# Patient Record
Sex: MALE | Race: WHITE | Employment: OTHER | URBAN - METROPOLITAN AREA
[De-identification: names, ages, dates, MRNs, and addresses within clinical notes are randomized per-mention and may not be internally consistent; named-entity substitution may affect disease eponyms.]

---

## 2017-01-12 ENCOUNTER — ANESTHESIA EVENT (OUTPATIENT)
Dept: SURGERY | Age: 82
DRG: 470 | End: 2017-01-12
Payer: MEDICARE

## 2017-01-12 PROBLEM — M19.011 OSTEOARTHRITIS OF BILATERAL GLENOHUMERAL JOINTS: Status: ACTIVE | Noted: 2017-01-12

## 2017-01-12 PROBLEM — M19.012 OSTEOARTHRITIS OF BILATERAL GLENOHUMERAL JOINTS: Status: ACTIVE | Noted: 2017-01-12

## 2017-01-12 PROBLEM — M17.12 PRIMARY OSTEOARTHRITIS OF LEFT KNEE: Status: ACTIVE | Noted: 2017-01-12

## 2017-01-13 ENCOUNTER — APPOINTMENT (OUTPATIENT)
Dept: GENERAL RADIOLOGY | Age: 82
DRG: 470 | End: 2017-01-13
Attending: ORTHOPAEDIC SURGERY
Payer: MEDICARE

## 2017-01-13 ENCOUNTER — SURGERY (OUTPATIENT)
Age: 82
End: 2017-01-13

## 2017-01-13 ENCOUNTER — ANESTHESIA (OUTPATIENT)
Dept: SURGERY | Age: 82
DRG: 470 | End: 2017-01-13
Payer: MEDICARE

## 2017-01-13 PROBLEM — M17.10 LOCALIZED PRIMARY OSTEOARTHRITIS OF LOWER LEG: Status: ACTIVE | Noted: 2017-01-13

## 2017-01-13 PROCEDURE — 73560 X-RAY EXAM OF KNEE 1 OR 2: CPT

## 2017-01-13 PROCEDURE — 74011250636 HC RX REV CODE- 250/636: Performed by: PHYSICIAN ASSISTANT

## 2017-01-13 PROCEDURE — 74011250636 HC RX REV CODE- 250/636

## 2017-01-13 RX ORDER — SODIUM CHLORIDE, SODIUM LACTATE, POTASSIUM CHLORIDE, CALCIUM CHLORIDE 600; 310; 30; 20 MG/100ML; MG/100ML; MG/100ML; MG/100ML
INJECTION, SOLUTION INTRAVENOUS
Status: DISCONTINUED | OUTPATIENT
Start: 2017-01-13 | End: 2017-01-13 | Stop reason: HOSPADM

## 2017-01-13 RX ORDER — PROPOFOL 10 MG/ML
INJECTION, EMULSION INTRAVENOUS
Status: DISCONTINUED | OUTPATIENT
Start: 2017-01-13 | End: 2017-01-13 | Stop reason: HOSPADM

## 2017-01-13 RX ORDER — FENTANYL CITRATE 50 UG/ML
INJECTION, SOLUTION INTRAMUSCULAR; INTRAVENOUS AS NEEDED
Status: DISCONTINUED | OUTPATIENT
Start: 2017-01-13 | End: 2017-01-13 | Stop reason: HOSPADM

## 2017-01-13 RX ORDER — ONDANSETRON 2 MG/ML
INJECTION INTRAMUSCULAR; INTRAVENOUS AS NEEDED
Status: DISCONTINUED | OUTPATIENT
Start: 2017-01-13 | End: 2017-01-13 | Stop reason: HOSPADM

## 2017-01-13 RX ORDER — PROPOFOL 10 MG/ML
INJECTION, EMULSION INTRAVENOUS AS NEEDED
Status: DISCONTINUED | OUTPATIENT
Start: 2017-01-13 | End: 2017-01-13 | Stop reason: HOSPADM

## 2017-01-13 RX ADMIN — ONDANSETRON 4 MG: 2 INJECTION INTRAMUSCULAR; INTRAVENOUS at 07:40

## 2017-01-13 RX ADMIN — PROPOFOL 60 MCG/KG/MIN: 10 INJECTION, EMULSION INTRAVENOUS at 07:50

## 2017-01-13 RX ADMIN — FENTANYL CITRATE 50 MCG: 50 INJECTION, SOLUTION INTRAMUSCULAR; INTRAVENOUS at 07:20

## 2017-01-13 RX ADMIN — MIDAZOLAM HYDROCHLORIDE 2 MG: 1 INJECTION, SOLUTION INTRAMUSCULAR; INTRAVENOUS at 07:20

## 2017-01-13 RX ADMIN — SODIUM CHLORIDE, SODIUM LACTATE, POTASSIUM CHLORIDE, CALCIUM CHLORIDE: 600; 310; 30; 20 INJECTION, SOLUTION INTRAVENOUS at 08:57

## 2017-01-13 RX ADMIN — SODIUM CHLORIDE: 900 INJECTION, SOLUTION INTRAVENOUS at 08:54

## 2017-01-13 RX ADMIN — BUPIVACAINE: 13.3 INJECTION, SUSPENSION, LIPOSOMAL INFILTRATION at 09:21

## 2017-01-13 RX ADMIN — SODIUM CHLORIDE, SODIUM LACTATE, POTASSIUM CHLORIDE, CALCIUM CHLORIDE: 600; 310; 30; 20 INJECTION, SOLUTION INTRAVENOUS at 07:17

## 2017-01-13 RX ADMIN — CEFAZOLIN 2 G: 1 INJECTION, POWDER, FOR SOLUTION INTRAMUSCULAR; INTRAVENOUS; PARENTERAL at 07:40

## 2017-01-13 RX ADMIN — PROPOFOL 20 MG: 10 INJECTION, EMULSION INTRAVENOUS at 07:43

## 2017-01-13 NOTE — ANESTHESIA PROCEDURE NOTES
Peripheral Block    Start time: 1/13/2017 7:10 AM  End time: 1/13/2017 7:21 AM  Performed by: Najma Cartagena  Authorized by: Najma Cartagena       Pre-procedure: Indications: at surgeon's request, post-op pain management and procedure for pain    Preanesthetic Checklist: patient identified, risks and benefits discussed, site marked, timeout performed, anesthesia consent given and patient being monitored    Timeout Time: 07:10          Block Type:   Block Type:   Adductor canal  Laterality:  Left  Monitoring:  Standard ASA monitoring, continuous pulse ox, frequent vital sign checks, oxygen, responsive to questions and heart rate  Injection Technique:  Single shot  Procedures: ultrasound guided    Patient Position: seated  Location:  Mid thigh  Needle Type:  Stimuplex  Needle Gauge:  22 G  Needle Localization:  Ultrasound guidance  Medication Injected:  0.5%  ropivacaine  Volume (mL):  20    Assessment:  Number of attempts:  1  Injection Assessment:  Incremental injection every 5 mL, no paresthesia, ultrasound image on chart, local visualized surrounding nerve on ultrasound, negative aspiration for blood and no intravascular symptoms  Patient tolerance:  Patient tolerated the procedure well with no immediate complications

## 2017-01-13 NOTE — ANESTHESIA PREPROCEDURE EVALUATION
Anesthetic History   No history of anesthetic complications            Review of Systems / Medical History  Patient summary reviewed, nursing notes reviewed and pertinent labs reviewed    Pulmonary  Within defined limits                 Neuro/Psych   Within defined limits           Cardiovascular    Hypertension          CAD, PAD and CABG    Exercise tolerance: >4 METS     GI/Hepatic/Renal     GERD: well controlled           Endo/Other        Arthritis     Other Findings            Physical Exam    Airway  Mallampati: II  TM Distance: > 6 cm  Neck ROM: normal range of motion   Mouth opening: Normal     Cardiovascular  Regular rate and rhythm,  S1 and S2 normal,  no murmur, click, rub, or gallop             Dental    Dentition: Full upper dentures     Pulmonary  Breath sounds clear to auscultation               Abdominal  GI exam deferred       Other Findings            Anesthetic Plan    ASA: 3  Anesthesia type: spinal      Post-op pain plan if not by surgeon: peripheral nerve block single    Induction: Intravenous  Anesthetic plan and risks discussed with: Patient

## 2017-01-13 NOTE — ANESTHESIA PROCEDURE NOTES
Spinal Block    Start time: 1/13/2017 7:40 AM  End time: 1/13/2017 7:49 AM  Performed by: Francia Jolley  Authorized by: Francia Jolley     Pre-procedure:   Indications: primary anesthetic  Preanesthetic Checklist: patient identified, risks and benefits discussed, anesthesia consent, site marked, patient being monitored and timeout performed      Spinal Block:   Patient Position:  Seated  Prep Region:  Lumbar  Prep: Betadine      Location:  L2-3  Technique:  Single shot  Local:  Lidocaine 1%  Local Dose (mL):  4    Needle:   Needle Type:  Pencan  Needle Gauge:  24 G  Attempts:  1      Events: CSF confirmed, no blood with aspiration and no paresthesia        Assessment:  Insertion:  Uncomplicated  Patient tolerance:  Patient tolerated the procedure well with no immediate complications

## 2017-01-17 ENCOUNTER — NURSE NAVIGATOR (OUTPATIENT)
Dept: OTHER | Age: 82
End: 2017-01-17

## 2017-01-17 NOTE — PROGRESS NOTES
Post Discharge Follow-up contact after Joint Replacement    Patient discharged on 1/14/17  By  Andrés Funes (Jody)   following  left knee Arthroplasty. Spoke with patient today, who reports they are \" doing fine except for constipation. I have a call into my PCP for something else. I am taking the Senna and Miralax, but I have not had a bowel movement yet. This happened to me the last time I had knee surgery and they gave me something then\"  Denies Fever, Shortness of Breath or Chest Pain. Patient also reports:. Incision  clean, dry, intact  Calf is not non-tender,   operative extremity has moderate swelling. Pain is well managed. Discussed use of ice & elevation. is progressing with therapy and isexercising on own. Taking Aspirin for anticoagulation, oxycodone and Tylenol for pain,   has resumed PTA medications. is not having regular BMs (waiting for PCP to respond to call this morning) & urinating without difficulty. Discussed side effects of anticoagulants & pain medications (bleeding/bruising, constipation, lightheaded/dizziness)  Follow up appointment is scheduled for 2/8. Discussed calling surgeon Dr Les Fung  for drainage, bleeding, swelling in operative extremity, fever or pain. Discussed callingl PCP Dr Verito Shirley with other medical issues.

## 2017-01-18 NOTE — ANESTHESIA POSTPROCEDURE EVALUATION
Post-Anesthesia Evaluation and Assessment    Patient: Yobani Mcgregor MRN: [id number]  SSN: [id number]    YOB: 1935  Age: 80 y.o. Sex: male       Cardiovascular Function/Vital Signs  Visit Vitals    /66 (BP 1 Location: Right arm, BP Patient Position: At rest)    Pulse (!) 59    Temp 36.6 °C (97.9 °F)    Resp 16    Ht 5' 7\" (1.702 m)    Wt 79.4 kg (175 lb)    SpO2 100%    BMI 27.41 kg/m2       Patient is status post spinal anesthesia for Procedure(s):  LEFT TOTAL KNEE ARTHROPLASTY . Nausea/Vomiting: None    Postoperative hydration reviewed and adequate. Pain:  Pain Scale 1: Numeric (0 - 10) (01/14/17 0800)  Pain Intensity 1: 0 (01/14/17 0800)   Managed    Neurological Status:   Neuro (WDL): Within Defined Limits (01/13/17 1200)  Neuro  Neurologic State: Alert (01/14/17 1000)  Orientation Level: Oriented X4 (01/14/17 1000)  Cognition: Follows commands; Impulsive (Pt stated, \"my wife tells me that\" when OT encouraged pt slo) (01/14/17 1000)  LUE Motor Response: Purposeful (01/14/17 0800)  LLE Motor Response: Purposeful (01/14/17 0800)  RUE Motor Response: Purposeful (01/14/17 0800)  RLE Motor Response: Purposeful (01/14/17 0800)   At baseline    Mental Status and Level of Consciousness: Arousable    Pulmonary Status:   O2 Device: Room air (01/14/17 0800)   Adequate oxygenation and airway patent    Complications related to anesthesia: None    Post-anesthesia assessment completed.  No concerns    Signed By: Zelda Maravilla MD     January 18, 2017

## 2017-02-16 ENCOUNTER — IP HISTORICAL/CONVERTED ENCOUNTER (OUTPATIENT)
Dept: OTHER | Age: 82
End: 2017-02-16

## 2019-04-16 ENCOUNTER — ED HISTORICAL/CONVERTED ENCOUNTER (OUTPATIENT)
Dept: OTHER | Age: 84
End: 2019-04-16

## 2019-05-07 ENCOUNTER — OP HISTORICAL/CONVERTED ENCOUNTER (OUTPATIENT)
Dept: OTHER | Age: 84
End: 2019-05-07

## 2019-09-24 ENCOUNTER — OP HISTORICAL/CONVERTED ENCOUNTER (OUTPATIENT)
Dept: OTHER | Age: 84
End: 2019-09-24

## 2019-10-01 ENCOUNTER — OP HISTORICAL/CONVERTED ENCOUNTER (OUTPATIENT)
Dept: OTHER | Age: 84
End: 2019-10-01

## 2019-11-01 ENCOUNTER — OP HISTORICAL/CONVERTED ENCOUNTER (OUTPATIENT)
Dept: OTHER | Age: 84
End: 2019-11-01

## 2019-12-03 ENCOUNTER — OP HISTORICAL/CONVERTED ENCOUNTER (OUTPATIENT)
Dept: OTHER | Age: 84
End: 2019-12-03

## 2020-02-06 ENCOUNTER — OP HISTORICAL/CONVERTED ENCOUNTER (OUTPATIENT)
Dept: OTHER | Age: 85
End: 2020-02-06

## 2020-03-03 ENCOUNTER — OP HISTORICAL/CONVERTED ENCOUNTER (OUTPATIENT)
Dept: OTHER | Age: 85
End: 2020-03-03

## 2021-06-21 ENCOUNTER — OFFICE VISIT (OUTPATIENT)
Dept: PODIATRY | Age: 86
End: 2021-06-21
Payer: MEDICARE

## 2021-06-21 VITALS
TEMPERATURE: 97.5 F | HEART RATE: 64 BPM | WEIGHT: 175 LBS | HEIGHT: 68 IN | OXYGEN SATURATION: 99 % | SYSTOLIC BLOOD PRESSURE: 146 MMHG | BODY MASS INDEX: 26.52 KG/M2 | DIASTOLIC BLOOD PRESSURE: 62 MMHG

## 2021-06-21 DIAGNOSIS — L60.0 INGROWN LEFT GREATER TOENAIL: Primary | ICD-10-CM

## 2021-06-21 PROCEDURE — G8419 CALC BMI OUT NRM PARAM NOF/U: HCPCS | Performed by: PODIATRIST

## 2021-06-21 PROCEDURE — 99203 OFFICE O/P NEW LOW 30 MIN: CPT | Performed by: PODIATRIST

## 2021-06-21 PROCEDURE — G8510 SCR DEP NEG, NO PLAN REQD: HCPCS | Performed by: PODIATRIST

## 2021-06-21 PROCEDURE — 1101F PT FALLS ASSESS-DOCD LE1/YR: CPT | Performed by: PODIATRIST

## 2021-06-21 PROCEDURE — 11730 AVULSION NAIL PLATE SIMPLE 1: CPT | Performed by: PODIATRIST

## 2021-06-21 PROCEDURE — G8536 NO DOC ELDER MAL SCRN: HCPCS | Performed by: PODIATRIST

## 2021-06-21 PROCEDURE — G8427 DOCREV CUR MEDS BY ELIG CLIN: HCPCS | Performed by: PODIATRIST

## 2021-06-21 RX ORDER — LOSARTAN POTASSIUM 50 MG/1
50 TABLET ORAL DAILY
COMMUNITY

## 2021-06-21 RX ORDER — TAMSULOSIN HYDROCHLORIDE 0.4 MG/1
0.4 CAPSULE ORAL DAILY
COMMUNITY

## 2021-06-21 RX ORDER — FUROSEMIDE 20 MG/1
20 TABLET ORAL DAILY
COMMUNITY

## 2021-06-21 NOTE — PROGRESS NOTES
Chief Complaint   Patient presents with    Foot Exam     pt states he would like his toenails trimmed    Ingrown Toenail     L great toenail     1. Have you been to the ER, urgent care clinic since your last visit? Hospitalized since your last visit? No    2. Have you seen or consulted any other health care providers outside of the [address] [place] [address] since your last visit? Include any pap smears or colon screening.  No  PCP-Dr [facility]

## 2021-06-24 ENCOUNTER — TELEPHONE (OUTPATIENT)
Dept: PODIATRY | Age: 86
End: 2021-06-24

## 2021-06-24 RX ORDER — SILVER SULFADIAZINE 10 G/1000G
CREAM TOPICAL DAILY
Qty: 25 G | Refills: 0 | Status: SHIPPED | OUTPATIENT
Start: 2021-06-24

## 2021-06-30 NOTE — PROGRESS NOTES
[facility]    Subjective:         Patient is a 80 y.o. male who is being seen as a new pt for left great toe pain. Patient states he has been suffering the pain for the past few weeks with increasing severity. He states pain at rest level of 6 out of 10 and is exacerbated with close toed shoes or pressure to the area. He states the pain is sharp in nature. He admits to local drainage and erythema but denies any systemic signs of infection. He denies any recent trauma. He denies any other lower extremity complaints    Past Medical History:   Diagnosis Date    Arthritis     CAD (coronary artery disease) 2009    CABG 3 WAY     Carotid stenosis     Dyslipidemia     GERD (gastroesophageal reflux disease)     Hypertension     Peripheral vascular disease (HCC)      Past Surgical History:   Procedure Laterality Date    CABG, ARTERY-VEIN, THREE  2009    HX APPENDECTOMY      HX GI      COLONOSCOPY    HX ORTHOPAEDIC  2015    right knee tkr    VASCULAR SURGERY PROCEDURE UNLIST  2016    L CAROTID STENT       Family History   Problem Relation Age of Onset    Heart Disease Mother     Arthritis-osteo Brother     Heart Disease Brother     No Known Problems Son     No Known Problems Son     Anesth Problems Neg Hx       Social History     Tobacco Use    Smoking status: Former Smoker     Packs/day: 2.00     Years: 10.00     Pack years: 20.00     Quit date: 1975     Years since quittin.8    Smokeless tobacco: Never Used   Substance Use Topics    Alcohol use: Yes     Alcohol/week: 4.0 standard drinks     Types: 3 Glasses of wine, 1 Shots of liquor per week     No Known Allergies  Prior to Admission medications    Medication Sig Start Date End Date Taking? Authorizing Provider   silver sulfADIAZINE (SILVADENE) 1 % topical cream Apply  to affected area daily. 21  Yes Salvatore Zepeda DPM   tamsulosin (FLOMAX) 0.4 mg capsule Take 0.4 mg by mouth daily. Yes Provider, Historical   apixaban (Eliquis) 5 mg tablet Take 5 mg by mouth two (2) times a day. Yes Provider, Historical   furosemide (LASIX) 20 mg tablet Take 20 mg by mouth daily. Yes Provider, Historical   losartan (COZAAR) 50 mg tablet Take 50 mg by mouth daily. Yes Provider, Historical   pantoprazole (PROTONIX) 40 mg tablet Take 40 mg by mouth nightly. Yes Provider, Historical   ezetimibe (ZETIA) 10 mg tablet Take 10 mg by mouth daily. Yes Provider, Historical   pitavastatin (LIVALO) 2 mg tablet Take 2 mg by mouth nightly. Yes Provider, Historical   Aspirin, Buffered 81 mg tab Take  by mouth. Takes every other day   Yes Provider, Historical   Cholestyramine-Aspartame (CHOLESTYRAMINE LIGHT) 4 gram powder Take 4 g by mouth daily. 1 SCOOP   Yes Provider, Historical       Review of Systems   Constitutional: Negative. HENT: Negative. Eyes: Negative. Respiratory: Negative. Cardiovascular: Positive for leg swelling. Gastrointestinal: Negative. Endocrine: Negative. Genitourinary: Positive for frequency. Musculoskeletal: Positive for arthralgias. Skin: Negative. Allergic/Immunologic: Negative. Neurological: Negative. Hematological: Bruises/bleeds easily. Psychiatric/Behavioral: Negative. All other systems reviewed and are negative. Objective:     Visit Vitals  BP (!) 146/62 (BP 1 Location: Left upper arm, BP Patient Position: Sitting, BP Cuff Size: Small adult)   Pulse 64   Temp 97.5 °F (36.4 °C) (Temporal)   Ht 5' 8\" (1.727 m)   Wt 175 lb (79.4 kg)   SpO2 99%   BMI 26.61 kg/m²       Physical Exam  Vitals reviewed. Constitutional:       Appearance: He is normal weight. Cardiovascular:      Pulses:           Dorsalis pedis pulses are 2+ on the right side and 2+ on the left side. Posterior tibial pulses are 2+ on the right side and 2+ on the left side.    Pulmonary:      Effort: Pulmonary effort is normal.   Musculoskeletal:      Right lower leg: Edema present. Left lower leg: Edema present. Right foot: Decreased range of motion. No Charcot foot or foot drop. Left foot: Decreased range of motion. No Charcot foot or foot drop. Feet:      Right foot:      Protective Sensation: 10 sites tested. 10 sites sensed. Skin integrity: Skin integrity normal.      Toenail Condition: Right toenails are normal.      Left foot:      Protective Sensation: 10 sites tested. 10 sites sensed. Skin integrity: Erythema present. Toenail Condition: Left toenails are ingrown. Lymphadenopathy:      Lower Body: No right inguinal adenopathy. No left inguinal adenopathy. Skin:     General: Skin is warm. Capillary Refill: Capillary refill takes 2 to 3 seconds. Neurological:      Mental Status: He is alert and oriented to person, place, and time. Psychiatric:         Mood and Affect: Mood and affect normal.         Behavior: Behavior is cooperative. Data Review: No results found for this or any previous visit (from the past 24 hour(s)). Impression:       ICD-10-CM ICD-9-CM    1. Ingrown left greater toenail  L60.0 703.0        Recommendation:     Patient seen and evaluated in the office  Discussed and educated patient regarding their current medical condition. Instructed patient to monitor their feet daily, be compliant with all medications and wear supportive shoe gear. After thorough discussion of treatment options, including monitoring or a possible slant back procedure or a more invasive procedure, patient is elected for partial nail avulsion of the offending nail border today in the office to the left hallux. Possible complications discussed. Written consent obtained  The left hallux was scrubbed and draped in sterile fashion and anesthetized with 10 cc of 1% lidocaine plain. The offending nail border was removed in its entirety. Appropriate wound care was performed.   Patient was given a prescription for topical antibiotic cream to be applied with a Band-Aid daily until drainage ceases. He was also instructed to perform Epsom salt and warm water soaks for 15 minutes each day at the same time period. Oleg Camacho, [address] [facility]  [address] [place] [address]  O: [phone]  F: [phone]  C: [phone]

## 2022-03-19 PROBLEM — M17.12 PRIMARY OSTEOARTHRITIS OF LEFT KNEE: Status: ACTIVE | Noted: 2017-01-12

## 2022-03-19 PROBLEM — M17.10 LOCALIZED PRIMARY OSTEOARTHRITIS OF LOWER LEG: Status: ACTIVE | Noted: 2017-01-13

## 2023-05-14 RX ORDER — CHOLESTYRAMINE LIGHT 4 G/5.7G
4 POWDER, FOR SUSPENSION ORAL DAILY
COMMUNITY

## 2023-05-14 RX ORDER — EZETIMIBE 10 MG/1
10 TABLET ORAL DAILY
COMMUNITY

## 2023-05-14 RX ORDER — PANTOPRAZOLE SODIUM 40 MG/1
40 TABLET, DELAYED RELEASE ORAL NIGHTLY
COMMUNITY

## 2023-05-14 RX ORDER — TAMSULOSIN HYDROCHLORIDE 0.4 MG/1
0.4 CAPSULE ORAL DAILY
COMMUNITY

## 2023-05-14 RX ORDER — FUROSEMIDE 20 MG/1
20 TABLET ORAL DAILY
COMMUNITY

## 2023-05-14 RX ORDER — LOSARTAN POTASSIUM 50 MG/1
50 TABLET ORAL DAILY
COMMUNITY

## 2024-09-04 ENCOUNTER — HOSPITAL ENCOUNTER (OUTPATIENT)
Facility: HOSPITAL | Age: 89
Discharge: HOME | End: 2024-09-07
Attending: FAMILY MEDICINE
Payer: MEDICARE

## 2024-09-04 DIAGNOSIS — R10.9 ABDOMINAL PAIN, UNSPECIFIED ABDOMINAL LOCATION: ICD-10-CM

## 2024-09-04 PROCEDURE — 76700 US EXAM ABDOM COMPLETE: CPT
